# Patient Record
(demographics unavailable — no encounter records)

---

## 2025-03-18 NOTE — REASON FOR VISIT
[FreeTextEntry1] : Patient with mild cardiomyopathy and baseline tachycardia possibly related to prior mediastinal surgery and mantle irradiation, and high blood pressure readings, now returns for follow up

## 2025-03-18 NOTE — REVIEW OF SYSTEMS
[Feeling Fatigued] : feeling fatigued [SOB] : shortness of breath [Dyspnea on exertion] : dyspnea during exertion [Negative] : Heme/Lymph [Weight Gain (___ Lbs)] : no recent weight gain [Weight Loss (___ Lbs)] : no recent weight loss [FreeTextEntry2] : Despite what chart says he is "same weight I have always been for 20 years" [FreeTextEntry5] : See HPI

## 2025-03-18 NOTE — ASSESSMENT
[FreeTextEntry1] : The patient had definitely improved somewhat. Stress echo in May, 2019 as noted. Excessive blood pressure response to exercise then as well as 8/2020. He is still on carvedilol, totally asymptomatic, and had a high blood pressure and heart rate in September but excellent today. No recurrence of his lightheadedness. Shortness of breath experience which he says he never experienced anything like that before, could have been just from the amount of exertion he was doing given that he was out of shape from COVID-19. His chest pressure was not exertional. Since then this has not recurred and he is active without chest pain or shortness of breath. Only new issue is fleeting numbness down his left arm which is much more likely to be nerve impingement etc. than anything circulatory or cardiac. In the past he had a normal chest x-ray and took rifampin prophylactically for exposure given his father's tuberculosis; had a fall pulmonary work-up as well. Including CT angiogram as mentioned above. We had discussed considering something like Entresto but since he has felt so well since, and his blood pressure was okay, he was once again reluctant to take additional medication. For now we will just continue to watch him and he will report any symptoms. Labs were sent previously and his hemoglobin A1c was up to 6.2 and his LDL was 118. He was eating a pint of ice cream every couple of days eating a lot of cheese etc. He finally got very serious about his diet and exercise and had lost 10 pounds had great blood pressure numbers on the outside heart rate staying below 100 etc. in September he got his LDL down to 104 with an HDL of 66 and his A1c was 5.8. Labs will be repeated today. If his BNP suddenly becomes abnormal that may change things as far as Entresto etc.. If labs are okay he will continue to maintain the diet and then he can he follow-up in 6 months. August will be 3 years since his last stress echo so we will schedule him for another stress test and echocardiogram The patient finally returned September 25, 2024. His problems have always been 1 of compliance with visits and tests but he does take his carvedilol on a regular basis. He has always had a mild form of cardiomyopathy and now he is complaining of decreased stamina and increased shortness of breath with exertion over the last 2 months. No findings of fluid overload on exam and his EKG is unchanged and his heart rate is okay but his blood pressure is elevated again. Labs will be sent including lipids, hemoglobin A1c, TSH, and a follow-up proBNP but he needs to be scheduled also for an echo and a stress test and to see not only that there is no ischemic heart disease especially given his family history but to see what his true blood pressure response to exercise is and then his medications can be adjusted if necessary.  Patient returned for stress test and echocardiogram. The echocardiogram was virtually unchanged from 3 years ago as he still has mild global hypokinesis with a left ventricular ejection fraction 45 to 50%. No change in his valve disease or LV size and function. Mild MR. Normal aorta. He had a ECG exercise stress test where he was able to exercise for 9-1/2 minutes reaching a heart rate of 129 and a blood pressure of 178/95. He had shortness of breath with maximum effort but no chest pain, no leg pains, there were no ST changes meeting ischemic criteria. There were PVCs couplets and 1 triplet at peak exercise.     Patient returns today totally asymptomatic with no interval medical or cardiac issues.  Blood pressure excellent, weight stable, exam and EKG totally unchanged and he has sinus rhythm at 85.  Good spirits.

## 2025-03-18 NOTE — PHYSICAL EXAM
[General Appearance - Well Developed] : well developed [Normal Appearance] : normal appearance [Well Groomed] : well groomed [General Appearance - Well Nourished] : well nourished [No Deformities] : no deformities [General Appearance - In No Acute Distress] : no acute distress [Normal Conjunctiva] : the conjunctiva exhibited no abnormalities [Eyelids - No Xanthelasma] : the eyelids demonstrated no xanthelasmas [Normal Oral Mucosa] : normal oral mucosa [No Oral Pallor] : no oral pallor [No Oral Cyanosis] : no oral cyanosis [Normal Jugular Venous A Waves Present] : normal jugular venous A waves present [Normal Jugular Venous V Waves Present] : normal jugular venous V waves present [No Jugular Venous Rinaldi A Waves] : no jugular venous rinaldi A waves [Respiration, Rhythm And Depth] : normal respiratory rhythm and effort [Exaggerated Use Of Accessory Muscles For Inspiration] : no accessory muscle use [Auscultation Breath Sounds / Voice Sounds] : lungs were clear to auscultation bilaterally [Heart Rate And Rhythm] : heart rate and rhythm were normal [Heart Sounds] : normal S1 and S2 [Murmurs] : no murmurs present [Abdomen Soft] : soft [Abdomen Tenderness] : non-tender [Abdomen Mass (___ Cm)] : no abdominal mass palpated [Abnormal Walk] : normal gait [Gait - Sufficient For Exercise Testing] : the gait was sufficient for exercise testing [Nail Clubbing] : no clubbing of the fingernails [Cyanosis, Localized] : no localized cyanosis [Petechial Hemorrhages (___cm)] : no petechial hemorrhages [Skin Color & Pigmentation] : normal skin color and pigmentation [] : no rash [No Venous Stasis] : no venous stasis [Skin Lesions] : no skin lesions [No Skin Ulcers] : no skin ulcer [No Xanthoma] : no  xanthoma was observed [Oriented To Time, Place, And Person] : oriented to person, place, and time [Affect] : the affect was normal [Mood] : the mood was normal [No Anxiety] : not feeling anxious [FreeTextEntry1] : No edema.  Pulses 2+ bilaterally symmetric including pedal

## 2025-03-18 NOTE — DISCUSSION/SUMMARY
[FreeTextEntry1] : Routine labs sent and patient asked for iron, B12, and testosterone.  If all okay we will just follow-up in 6 months for clinical visit.  Lipids and proBNP will be checked as well. [EKG obtained to assist in diagnosis and management of assessed problem(s)] : EKG obtained to assist in diagnosis and management of assessed problem(s)

## 2025-03-18 NOTE — CARDIOLOGY SUMMARY
[No Ischemia] : no Ischemia [___] : [unfilled] [LVEF ___%] : LVEF [unfilled]% [Mild] : mild mitral regurgitation [___] : [unfilled] [Normal] : normal